# Patient Record
Sex: FEMALE | Race: WHITE | Employment: UNEMPLOYED | URBAN - METROPOLITAN AREA
[De-identification: names, ages, dates, MRNs, and addresses within clinical notes are randomized per-mention and may not be internally consistent; named-entity substitution may affect disease eponyms.]

---

## 2021-03-02 ENCOUNTER — HOSPITAL ENCOUNTER (EMERGENCY)
Age: 26
Discharge: PSYCHIATRIC OR SUBSTANCE USE FACILITY | End: 2021-03-04
Attending: EMERGENCY MEDICINE

## 2021-03-02 DIAGNOSIS — R45.851 SUICIDAL IDEATIONS: Primary | ICD-10-CM

## 2021-03-02 LAB
ACETAMINOPHEN LEVEL: <5 MCG/ML (ref 10–30)
ALBUMIN SERPL-MCNC: 4.8 G/DL (ref 3.5–5.2)
ALP BLD-CCNC: 57 U/L (ref 35–104)
ALT SERPL-CCNC: <5 U/L (ref 0–32)
AMPHETAMINE SCREEN, URINE: NOT DETECTED
ANION GAP SERPL CALCULATED.3IONS-SCNC: 11 MMOL/L (ref 7–16)
AST SERPL-CCNC: 16 U/L (ref 0–31)
BACTERIA: ABNORMAL /HPF
BARBITURATE SCREEN URINE: NOT DETECTED
BASOPHILS ABSOLUTE: 0.02 E9/L (ref 0–0.2)
BASOPHILS RELATIVE PERCENT: 0.3 % (ref 0–2)
BENZODIAZEPINE SCREEN, URINE: NOT DETECTED
BILIRUB SERPL-MCNC: 0.3 MG/DL (ref 0–1.2)
BILIRUBIN URINE: NEGATIVE
BLOOD, URINE: ABNORMAL
BUN BLDV-MCNC: 11 MG/DL (ref 6–20)
CALCIUM SERPL-MCNC: 9.7 MG/DL (ref 8.6–10.2)
CANNABINOID SCREEN URINE: POSITIVE
CHLORIDE BLD-SCNC: 103 MMOL/L (ref 98–107)
CLARITY: ABNORMAL
CO2: 25 MMOL/L (ref 22–29)
COCAINE METABOLITE SCREEN URINE: NOT DETECTED
COLOR: YELLOW
CREAT SERPL-MCNC: 0.5 MG/DL (ref 0.5–1)
EOSINOPHILS ABSOLUTE: 0.05 E9/L (ref 0.05–0.5)
EOSINOPHILS RELATIVE PERCENT: 0.8 % (ref 0–6)
EPITHELIAL CELLS, UA: ABNORMAL /HPF
ETHANOL: <10 MG/DL (ref 0–0.08)
FENTANYL SCREEN, URINE: NOT DETECTED
GFR AFRICAN AMERICAN: >60
GFR NON-AFRICAN AMERICAN: >60 ML/MIN/1.73
GLUCOSE BLD-MCNC: 94 MG/DL (ref 74–99)
GLUCOSE URINE: NEGATIVE MG/DL
HCG, URINE, POC: NEGATIVE
HCT VFR BLD CALC: 41.8 % (ref 34–48)
HEMOGLOBIN: 13.5 G/DL (ref 11.5–15.5)
IMMATURE GRANULOCYTES #: 0.01 E9/L
IMMATURE GRANULOCYTES %: 0.2 % (ref 0–5)
KETONES, URINE: 15 MG/DL
LEUKOCYTE ESTERASE, URINE: ABNORMAL
LYMPHOCYTES ABSOLUTE: 2.23 E9/L (ref 1.5–4)
LYMPHOCYTES RELATIVE PERCENT: 36 % (ref 20–42)
Lab: ABNORMAL
Lab: NORMAL
MCH RBC QN AUTO: 29.3 PG (ref 26–35)
MCHC RBC AUTO-ENTMCNC: 32.3 % (ref 32–34.5)
MCV RBC AUTO: 90.7 FL (ref 80–99.9)
METHADONE SCREEN, URINE: NOT DETECTED
MONOCYTES ABSOLUTE: 0.52 E9/L (ref 0.1–0.95)
MONOCYTES RELATIVE PERCENT: 8.4 % (ref 2–12)
NEGATIVE QC PASS/FAIL: NORMAL
NEUTROPHILS ABSOLUTE: 3.36 E9/L (ref 1.8–7.3)
NEUTROPHILS RELATIVE PERCENT: 54.3 % (ref 43–80)
NITRITE, URINE: NEGATIVE
OPIATE SCREEN URINE: NOT DETECTED
OXYCODONE URINE: NOT DETECTED
PDW BLD-RTO: 12.4 FL (ref 11.5–15)
PH UA: 6 (ref 5–9)
PHENCYCLIDINE SCREEN URINE: NOT DETECTED
PLATELET # BLD: 246 E9/L (ref 130–450)
PMV BLD AUTO: 9.1 FL (ref 7–12)
POSITIVE QC PASS/FAIL: NORMAL
POTASSIUM SERPL-SCNC: 3.8 MMOL/L (ref 3.5–5)
PROTEIN UA: NEGATIVE MG/DL
RBC # BLD: 4.61 E12/L (ref 3.5–5.5)
RBC UA: ABNORMAL /HPF (ref 0–2)
SALICYLATE, SERUM: <0.3 MG/DL (ref 0–30)
SARS-COV-2, NAAT: NOT DETECTED
SODIUM BLD-SCNC: 139 MMOL/L (ref 132–146)
SPECIFIC GRAVITY UA: >=1.03 (ref 1–1.03)
T4 TOTAL: 9.2 MCG/DL (ref 4.5–11.7)
TOTAL PROTEIN: 7.3 G/DL (ref 6.4–8.3)
TRICYCLIC ANTIDEPRESSANTS SCREEN SERUM: NEGATIVE NG/ML
UROBILINOGEN, URINE: 1 E.U./DL
WBC # BLD: 6.2 E9/L (ref 4.5–11.5)
WBC UA: ABNORMAL /HPF (ref 0–5)

## 2021-03-02 PROCEDURE — 99285 EMERGENCY DEPT VISIT HI MDM: CPT

## 2021-03-02 PROCEDURE — 80053 COMPREHEN METABOLIC PANEL: CPT

## 2021-03-02 PROCEDURE — 85025 COMPLETE CBC W/AUTO DIFF WBC: CPT

## 2021-03-02 PROCEDURE — 80143 DRUG ASSAY ACETAMINOPHEN: CPT

## 2021-03-02 PROCEDURE — 87635 SARS-COV-2 COVID-19 AMP PRB: CPT

## 2021-03-02 PROCEDURE — 93005 ELECTROCARDIOGRAM TRACING: CPT | Performed by: NURSE PRACTITIONER

## 2021-03-02 PROCEDURE — 82077 ASSAY SPEC XCP UR&BREATH IA: CPT

## 2021-03-02 PROCEDURE — 80307 DRUG TEST PRSMV CHEM ANLYZR: CPT

## 2021-03-02 PROCEDURE — 84436 ASSAY OF TOTAL THYROXINE: CPT

## 2021-03-02 PROCEDURE — 81001 URINALYSIS AUTO W/SCOPE: CPT

## 2021-03-02 PROCEDURE — 80179 DRUG ASSAY SALICYLATE: CPT

## 2021-03-02 NOTE — ED PROVIDER NOTES
HPI:  3/2/21,   Time: 5:44 PM JACKIE Rodriguez is a 31 y.o. female presenting to the ED for depression and suicidal ideations. Patient states she has a long history of feeling depressed and suicidal thoughts ever since she was young, however they have recently become worse due to increase in stress in her life. Patient states symptoms became much worse this past weekend, she became very depressed and almost took valerian root and melatonin in an attempt to kill herself, however she did not actually make any attempts. Denies ever taking psychiatric medications or being in inpatient psychiatric facility before in the past.  She denies any physical complaints. ROS:   GEN: no fevers, no chills, no fatique  HENT: No trauma, no sore throat, no swelling throat or oral mucosa  EYES: No changes in vision, no pain  CARDIO: No chest pain, no palpitations  PULM: No trouble breathing, no wheezing  ABD: No pain, no nausea, no vomiting  MSK: No pain, no trauma, no deformities  SKIN: No rashes, no abrasions, no lacerations  NEURO: No changes in mentation, no headache, no weakness   PSYCH: See HPI    --------------------------------------------- PAST HISTORY ---------------------------------------------  Past Medical History:  has no past medical history on file. Past Surgical History:  has no past surgical history on file. Social History:  reports that she has been smoking cigarettes. She has been smoking about 0.25 packs per day. She has never used smokeless tobacco. She reports previous drug use. Family History: family history is not on file. The patients home medications have been reviewed. Allergies: Patient has no known allergies.     -------------------------------------------------- RESULTS -------------------------------------------------  All laboratory and radiology results have been personally reviewed by myself   LABS:  Results for orders placed or performed during the hospital encounter of 03/02/21   Comprehensive Metabolic Panel   Result Value Ref Range    Sodium 139 132 - 146 mmol/L    Potassium 3.8 3.5 - 5.0 mmol/L    Chloride 103 98 - 107 mmol/L    CO2 25 22 - 29 mmol/L    Anion Gap 11 7 - 16 mmol/L    Glucose 94 74 - 99 mg/dL    BUN 11 6 - 20 mg/dL    CREATININE 0.5 0.5 - 1.0 mg/dL    GFR Non-African American >60 >=60 mL/min/1.73    GFR African American >60     Calcium 9.7 8.6 - 10.2 mg/dL    Total Protein 7.3 6.4 - 8.3 g/dL    Albumin 4.8 3.5 - 5.2 g/dL    Total Bilirubin 0.3 0.0 - 1.2 mg/dL    Alkaline Phosphatase 57 35 - 104 U/L    ALT <5 0 - 32 U/L    AST 16 0 - 31 U/L   CBC Auto Differential   Result Value Ref Range    WBC 6.2 4.5 - 11.5 E9/L    RBC 4.61 3.50 - 5.50 E12/L    Hemoglobin 13.5 11.5 - 15.5 g/dL    Hematocrit 41.8 34.0 - 48.0 %    MCV 90.7 80.0 - 99.9 fL    MCH 29.3 26.0 - 35.0 pg    MCHC 32.3 32.0 - 34.5 %    RDW 12.4 11.5 - 15.0 fL    Platelets 086 149 - 827 E9/L    MPV 9.1 7.0 - 12.0 fL    Neutrophils % 54.3 43.0 - 80.0 %    Immature Granulocytes % 0.2 0.0 - 5.0 %    Lymphocytes % 36.0 20.0 - 42.0 %    Monocytes % 8.4 2.0 - 12.0 %    Eosinophils % 0.8 0.0 - 6.0 %    Basophils % 0.3 0.0 - 2.0 %    Neutrophils Absolute 3.36 1.80 - 7.30 E9/L    Immature Granulocytes # 0.01 E9/L    Lymphocytes Absolute 2.23 1.50 - 4.00 E9/L    Monocytes Absolute 0.52 0.10 - 0.95 E9/L    Eosinophils Absolute 0.05 0.05 - 0.50 E9/L    Basophils Absolute 0.02 0.00 - 0.20 E9/L   Serum Drug Screen   Result Value Ref Range    Ethanol Lvl <10 mg/dL    Acetaminophen Level <5.0 (L) 10.0 - 75.9 mcg/mL    Salicylate, Serum <8.2 0.0 - 30.0 mg/dL    TCA Scrn NEGATIVE Cutoff:300 ng/mL   Urine Drug Screen   Result Value Ref Range    Amphetamine Screen, Urine NOT DETECTED Negative <1000 ng/mL    Barbiturate Screen, Ur NOT DETECTED Negative < 200 ng/mL    Benzodiazepine Screen, Urine NOT DETECTED Negative < 200 ng/mL    Cannabinoid Scrn, Ur POSITIVE (A) Negative < 50ng/mL    Cocaine Metabolite Screen, Urine NOT DETECTED Negative < 300 ng/mL    Opiate Scrn, Ur NOT DETECTED Negative < 300ng/mL    PCP Screen, Urine NOT DETECTED Negative < 25 ng/mL    Methadone Screen, Urine NOT DETECTED Negative <300 ng/mL    Oxycodone Urine NOT DETECTED Negative <100 ng/mL    FENTANYL SCREEN, URINE NOT DETECTED Negative <1 ng/mL    Drug Screen Comment: see below    Urinalysis   Result Value Ref Range    Color, UA Yellow Straw/Yellow    Clarity, UA SL CLOUDY Clear    Glucose, Ur Negative Negative mg/dL    Bilirubin Urine Negative Negative    Ketones, Urine 15 (A) Negative mg/dL    Specific Gravity, UA >=1.030 1.005 - 1.030    Blood, Urine TRACE-INTACT Negative    pH, UA 6.0 5.0 - 9.0    Protein, UA Negative Negative mg/dL    Urobilinogen, Urine 1.0 <2.0 E.U./dL    Nitrite, Urine Negative Negative    Leukocyte Esterase, Urine SMALL (A) Negative   T4   Result Value Ref Range    T4, Total 9.2 4.5 - 11.7 mcg/dL   Microscopic Urinalysis   Result Value Ref Range    WBC, UA 5-10 (A) 0 - 5 /HPF    RBC, UA 2-5 0 - 2 /HPF    Epithelial Cells, UA MODERATE /HPF    Bacteria, UA MANY (A) None Seen /HPF   POC Pregnancy Urine Qual   Result Value Ref Range    HCG, Urine, POC Negative Negative    Lot Number [id number]     Positive QC Pass/Fail Pass     Negative QC Pass/Fail Pass    EKG 12 Lead   Result Value Ref Range    Ventricular Rate 92 BPM    Atrial Rate 92 BPM    P-R Interval 124 ms    QRS Duration 76 ms    Q-T Interval 332 ms    QTc Calculation (Bazett) 410 ms    P Axis 40 degrees    R Axis 28 degrees    T Axis 49 degrees       RADIOLOGY:  Interpreted by Radiologist.  No orders to display       ------------------------- NURSING NOTES AND VITALS REVIEWED ---------------------------   The nursing notes within the ED encounter and vital signs as below have been reviewed.    /78   Pulse 110   Temp 98.2 °F (36.8 °C)   Resp 16   Ht 5' 2\" (1.575 m)   Wt 132 lb (59.9 kg)   SpO2 93%   BMI 24.14 kg/m²   Oxygen Saturation Interpretation: Normal    ---------------------------------------------------PHYSICAL EXAM--------------------------------------    GEN: No acute distress, well-appearing, well nourished   HENT: Normocephalic, atraumatic, oral mucosa moist  EYES: No scleral injection, no scleral icterus, PERRL   PULM: Lungs clear to ascultation bilaterally, no wheezes, no crackles  CARDIO: Regular rate, regular rhythm, normal S1/S2  ABD: Soft, non-tender, no rigidity  MSK: No deformities, no edema, palpable pulses all extremities   SKIN: No rashes, no lacerations, no abrasions   NEURO: Awake, alert, appropriate  PSYCH: Calm, cooperative, rational thought, coherent speech. Admits to depression and suicidal ideations. Denies homicidal thoughts or ideations. Denies hallucinations.        ------------------------------ ED COURSE/MEDICAL DECISION MAKING----------------------  Medications - No data to display      ED Course and Medical Decision Making:   Patient nontoxic well-appearing hemodynamically stable throughout ED stay. Work-up reviewed, no significant acute process, patient medically cleared at this time for placement in inpatient psychiatric facility.       --------------------------------- ADDITIONAL PROVIDER NOTES ---------------------------------    This patient's ED course included: re-evaluation prior to disposition and a personal history and physicial eaxmination    This patient has remained hemodynamically stable during their ED course. Counseling: The emergency provider has spoken with the patient and discussed todays results, in addition to providing specific details for the plan of care and counseling regarding the diagnosis and prognosis. Questions are answered at this time and they are agreeable with the plan.      --------------------------------- IMPRESSION AND DISPOSITION ---------------------------------    IMPRESSION  1.  Suicidal ideations        DISPOSITION  Disposition: Admit to mental health unit - medically cleared for admission  Patient condition is good        Claudia Dickson,   03/02/21 2209

## 2021-03-02 NOTE — ED TRIAGE NOTES
FIRST PROVIDER CONTACT ASSESSMENT NOTE      Department of Emergency Medicine   ED  First Provider Note   3/2/21  5:15 PM EST    Chief Complaint: Psychiatric Evaluation (has been depressed lately, +SI, plan is to OD on sleeping pills, -HI)      History of Present Illness:    Tameka Rodriguez is a 31 y.o. female who presents to the ED by private car for suicidal ideation with plan to take melatonin and valerian root. Focused Screening Exam:  Constitutional:  Alert, appears stated age and is in no distress. Respirations easy nonlabored. Skin pink warm and dry. Patient admits to suicidal ideation and is tearful. *ALLERGIES*     Patient has no allergy information on record.      ED Triage Vitals   BP Temp Temp src Pulse Resp SpO2 Height Weight   03/02/21 1713 03/02/21 1642 -- 03/02/21 1642 03/02/21 1642 03/02/21 1642 03/02/21 1713 --   122/78 98.2 °F (36.8 °C)  110 16 93 % 5' 2\" (1.575 m)         Initial Plan of Care:  Initiate Treatment-Testing, Proceed toTreatment Area When Bed Available for ED Attending/MLP to Continue Care    -----------------END OF FIRST PROVIDER CONTACT ASSESSMENT NOTE--------------  Electronically signed by KELSEY Tubbs CNP   DD: 3/2/21

## 2021-03-03 LAB
EKG ATRIAL RATE: 92 BPM
EKG P AXIS: 40 DEGREES
EKG P-R INTERVAL: 124 MS
EKG Q-T INTERVAL: 332 MS
EKG QRS DURATION: 76 MS
EKG QTC CALCULATION (BAZETT): 410 MS
EKG R AXIS: 28 DEGREES
EKG T AXIS: 49 DEGREES
EKG VENTRICULAR RATE: 92 BPM

## 2021-03-03 PROCEDURE — 6370000000 HC RX 637 (ALT 250 FOR IP): Performed by: EMERGENCY MEDICINE

## 2021-03-03 PROCEDURE — 93010 ELECTROCARDIOGRAM REPORT: CPT | Performed by: INTERNAL MEDICINE

## 2021-03-03 RX ORDER — LORAZEPAM 1 MG/1
2 TABLET ORAL ONCE
Status: COMPLETED | OUTPATIENT
Start: 2021-03-03 | End: 2021-03-03

## 2021-03-03 RX ORDER — LORAZEPAM 1 MG/1
1 TABLET ORAL ONCE
Status: COMPLETED | OUTPATIENT
Start: 2021-03-03 | End: 2021-03-03

## 2021-03-03 RX ADMIN — LORAZEPAM 2 MG: 1 TABLET ORAL at 03:05

## 2021-03-03 RX ADMIN — LORAZEPAM 1 MG: 1 TABLET ORAL at 21:03

## 2021-03-03 NOTE — ED NOTES
PT IS AWARE THAT SHE IS BEING REFERRED TO [address] [place] [address] FOR [facility] APPROVAL. SHE IS ALSO AWARE THAT [facility] HAS A LONG WAIT LIST AND THAT SHE MAY BE IN THE ER AWAITING A BED FOR A WHILE.      SHANTELL Mix  03/02/21 9666

## 2021-03-03 NOTE — ED NOTES
Elizabeth from [facility] informed that Dr Sulma Pat has accepted Pt. Pt is on waiting list. They should have updates tomorrow as to how long it should be before they have a bed available.      VANESSA Lehman, [place]  03/02/21 1990

## 2021-03-03 NOTE — ED NOTES
[address], 2016 [facility] BED DAYS TO [facility]    CHART FAXED TO [address] [place] [address]  03/02/21 9211

## 2021-03-03 NOTE — ED NOTES
Emergency Department [facility] AN AFFILIATE OF [facility] Biopsychosocial Assessment Note    Chief Complaint: has been depressed lately, +SI, plan is to OD on sleeping pills, -HI, almost attempted suicide this weekend    MSE:  CALM, COOPERATIVE, ALERT, ORIENTED X'S 3, SHARES GOOD EYE CONTACT, HAS CLEAR SPEECH, HAS POOR INSIGHT AND JUDGEMENT, MOOD IS SAD AND DEPRESSED, ANXIOUS, FLAT AFFECT AND HAS POOR INSIGHT AND JUDGEMENT. Clinical Summary/History:   PT IS IN THIS AREA FROM [place], VISITING HER MOM. PT IS A 31YEAR OLD FEMALE,  AND GOING THROUGH A DIVORCE, HAS A 10YEAR OLD SON WHO IS SAFE WITH HER FAMILY. PT PRESENTS WITH SUICIDAL IDEATIONS AND RECENT PLAN TO OD ON SLEEPING PILLS \"VALERIAN ROOT AND MELATONIN\". PT EXPLAINED THAT SHE HAS BEEN  FOR THE PAST 2 YEARS AND IS NOW GETTING A DIVORCE. SHE APPEARS TO BE HOPELESS, STATING \"I FEEL LIKE MY LIFE IS FALLING APART\". SHE SAID THAT SHE CONSTANTLY THINKS ABOUT KILLING HERSELF, AND WAS CLOSE TO FOLLOWING THROUGH WITH HER PLAN OF OVERDOSING THIS PAST WEEKEND. SHE SAID THAT SHE THOUGHT ABOUT HER SON AND DID NOT FOLLOW THROUGH WITH HER PLAN. PT SAID THAT SHE FEELS AS IS SHE HAS \"LOST CONTROL\" OF HER EMOTIONS AND FEELS OVERALL UNSTABLE. ANOTHER STRESSOR IS THAT SHE HAS BEEN STAYING WITH HER MOM WHILE HERE VISITING AND THEIR RELATIONSHIP IS \"TOXIC\". PT REPORTS THAT SHE HAS HAD NO PREVIOUS ADMISSIONS, HAS NO MH HX WITH TREATING PROVIDERS, AND IS ON NO MH MEDICATIONS. PT DENIES HI AND IS HAVING NO HALLUCINATIONS. Gender  [] Male [x] Female [] Transgender  [] Other    Sexual Orientation    [x] Heterosexual [] Homosexual [] Bisexual [] Other    Suicidal Behavioral: CSSR-S Complete. [x] Reports:    [] Past [] Present   [] Denies    Homicidal/ Violent Behavior  [] Reports:   [] Past [] Present   [x] Denies     Hallucinations/Delusions   [] Reports:   [x] Denies     Substance Use/Alcohol Use/Addiction: SBIRT Screen Complete.    [] Reports:   [x] Denies Trauma History  [] Reports:  [x] Denies     Collateral Information:   NO COLLATERAL OBTAINED AT THIS TIME    Level of Care/Disposition Plan  [] Home:   [] Outpatient Provider:   [] Crisis Unit:   [x] Inpatient Psychiatric Unit:  [] Other:        SHANTELL Oconnor  03/02/21 7112

## 2021-03-03 NOTE — ED NOTES
AVEL spoke with Vanessa Perez in Admissions regarding status of acceptance at [facility] for pt. [phone]    Javad Jauregui states pt is accepted but there are no beds available for pt today.         VANESSA Gómez, [place]  03/03/21 1534

## 2021-03-03 NOTE — ED NOTES
Pt calm and cooperative makes appropriate eye contact with staff and up at desk talking to staff. She reports she does not want to harm herself and her being here is a big misunderstanding. No further orders at this time.   Care continued     Rosi Mak RN  03/03/21 3794

## 2021-03-03 NOTE — ED NOTES
CALLED [facility] [phone] AND SPOKE TO [address] WHO REQUESTED THAT THE CHART BE FAXED TO [facility] ROLAND Sheikh, SHANTELL  03/02/21 9512

## 2021-03-04 VITALS
OXYGEN SATURATION: 98 % | SYSTOLIC BLOOD PRESSURE: 106 MMHG | RESPIRATION RATE: 16 BRPM | BODY MASS INDEX: 24.29 KG/M2 | HEIGHT: 62 IN | DIASTOLIC BLOOD PRESSURE: 79 MMHG | WEIGHT: 132 LBS | TEMPERATURE: 98.1 F | HEART RATE: 85 BPM

## 2021-03-04 PROCEDURE — 6370000000 HC RX 637 (ALT 250 FOR IP): Performed by: EMERGENCY MEDICINE

## 2021-03-04 RX ORDER — LORAZEPAM 1 MG/1
2 TABLET ORAL ONCE
Status: COMPLETED | OUTPATIENT
Start: 2021-03-04 | End: 2021-03-04

## 2021-03-04 RX ADMIN — LORAZEPAM 2 MG: 1 TABLET ORAL at 20:37

## 2021-03-04 NOTE — ED NOTES
SW left message with [facility] Admissions Department regarding bed status at [facility] for pt's admission. [phone]     VANESSA Leary, AARONW  03/04/21 1039

## 2021-03-04 NOTE — ED NOTES
RECEIVED CALL FROM DREW AT [facility]    PT ACCEPTED BY DR Jeff Bañuelos UNIT C2    N2N [phone]    JOSE DANIEL, RN NOTIFIED    PHYSICIAN'S TO TRANSPORT    ETA 17:30    AMBULANCE PACKET COMPLETE     Marcelina Watkins, MSW, LSW  03/04/21 1521

## 2021-03-04 NOTE — ED NOTES
PHYSICIANS CALLED AND SAID IT WOULD BE ANOTHER OUR BEFORE TRANSPORT ARRIVES     VANESSA Monzon, [place]  03/04/21 9823

## 2021-03-04 NOTE — ED NOTES
AVEL received phone call from Cliff at [facility] unit [phone]. Cliff states she was misinformed and that they are able to utilize their funding for facilities other than [facility]. Cliff states that if pt is unable to be accepted to a [place] bed today, then to call her for assistance in locating another facility that can accept pt. AVEL left another message with [facility] Admissions Department regarding bed status at [facility] for pt's admission.  [phone]          Akhil Johnson, VANESSA, LSW  03/04/21 7269

## 2021-03-04 NOTE — ED NOTES
SW received call from [facility] in [address] states that they are expecting a patient to be discharged today and that this pt will be dispositioned into that bed upon his/her departure. Niharika anticipates that this will be later this evening and that she will let us know when they will have available staff to process a new admission. Kain Mcleod states it will be either this evening or first thing tomorrow morning.       VANESSA Mohr, [facility]  03/04/21 1824

## 2021-03-05 NOTE — ED NOTES
[facility] arrived at 10:07 pm  to transport pt to [facility]. Called [facility] and informed Rebekah Lewis that she was picked up.       VANESSA Contreras, [facility]  03/04/21 9202

## 2021-03-05 NOTE — ED NOTES
Call to [facility] inquiring about overdue transport time of patient. Patient was scheduled to be transported at 5:30 and then [facility] called before 5 and reported they would be another hour. This T called them at 7:30 and they said it would be another 90 minutes to 2 hours. This T inquired as to why, when the patient was scheduled for  several hours prior.       Mauro Melgar  03/04/21 2057

## 2022-07-11 RX ORDER — SERTRALINE HYDROCHLORIDE 25 MG/1
TABLET, FILM COATED ORAL
Qty: 30 TABLET | Refills: 3 | OUTPATIENT
Start: 2022-07-11
